# Patient Record
Sex: MALE | Race: AMERICAN INDIAN OR ALASKA NATIVE
[De-identification: names, ages, dates, MRNs, and addresses within clinical notes are randomized per-mention and may not be internally consistent; named-entity substitution may affect disease eponyms.]

---

## 2020-01-14 ENCOUNTER — HOSPITAL ENCOUNTER (EMERGENCY)
Dept: HOSPITAL 5 - ED | Age: 20
Discharge: HOME | End: 2020-01-14
Payer: SELF-PAY

## 2020-01-14 VITALS — DIASTOLIC BLOOD PRESSURE: 80 MMHG | SYSTOLIC BLOOD PRESSURE: 118 MMHG

## 2020-01-14 DIAGNOSIS — K21.9: Primary | ICD-10-CM

## 2020-01-14 DIAGNOSIS — Z79.899: ICD-10-CM

## 2020-01-14 LAB
ALBUMIN SERPL-MCNC: 3.7 G/DL (ref 3.9–5)
ALT SERPL-CCNC: 10 UNITS/L (ref 7–56)
BACTERIA #/AREA URNS HPF: (no result) /HPF
BILIRUB UR QL STRIP: (no result)
BLOOD UR QL VISUAL: (no result)
BUN SERPL-MCNC: 9 MG/DL (ref 9–20)
BUN/CREAT SERPL: 11 %
CALCIUM SERPL-MCNC: 9.1 MG/DL (ref 8.4–10.2)
HCT VFR BLD CALC: 39.4 % (ref 35.5–45.6)
HEMOLYSIS INDEX: 5
HGB BLD-MCNC: 13.5 GM/DL (ref 11.8–15.2)
MCHC RBC AUTO-ENTMCNC: 34 % (ref 32–34)
MCV RBC AUTO: 83 FL (ref 84–94)
MUCOUS THREADS #/AREA URNS HPF: (no result) /HPF
PH UR STRIP: 5 [PH] (ref 5–7)
PLATELET # BLD: 169 K/MM3 (ref 140–440)
PROT UR STRIP-MCNC: (no result) MG/DL
RBC # BLD AUTO: 4.73 M/MM3 (ref 3.65–5.03)
RBC #/AREA URNS HPF: 2 /HPF (ref 0–6)
UROBILINOGEN UR-MCNC: < 2 MG/DL (ref ?–2)
WBC #/AREA URNS HPF: < 1 /HPF (ref 0–6)

## 2020-01-14 PROCEDURE — 81001 URINALYSIS AUTO W/SCOPE: CPT

## 2020-01-14 PROCEDURE — 36415 COLL VENOUS BLD VENIPUNCTURE: CPT

## 2020-01-14 PROCEDURE — 85027 COMPLETE CBC AUTOMATED: CPT

## 2020-01-14 PROCEDURE — 83690 ASSAY OF LIPASE: CPT

## 2020-01-14 PROCEDURE — 80053 COMPREHEN METABOLIC PANEL: CPT

## 2020-01-14 NOTE — EMERGENCY DEPARTMENT REPORT
ED Abdominal Pain HPI





- General


Chief Complaint: Abdominal Pain


Stated Complaint: STOMACH PAIN


Time Seen by Provider: 20 07:10


Source: patient, EMS


Mode of arrival: Ambulatory


Limitations: No Limitations





- History of Present Illness


Initial Comments: 





This is a 19-year-old -American male who presents to the emergency room 

with diffuse abdominal pain started yesterday.  Reports pain is occasional but 

lasts for several minutes to an hour.  Current marijuana smoker.  Patient denies

recent travel, nausea, vomiting, diarrhea, fever, chills, cough, chest pain, or 

palpitations.


MD Complaint: abdominal pain


Onset/Timin


-: days(s)


Location: diffuse


Radiation: none


Migration to: no migration


Severity: moderate


Severity scale (0 -10): 6


Quality: cramping


Consistency: intermittent


Improves With: nothing


Worsens With: nothing


Associated Symptoms: denies other symptoms





- Related Data


                                  Previous Rx's











 Medication  Instructions  Recorded  Last Taken  Type


 


Oseltamivir [Tamiflu] 75 mg PO BID #10 cap 18 Unknown Rx


 


Ibuprofen [Motrin] 800 mg PO Q8HR #30 tablet 18 Unknown Rx


 


methOCARBAMOL [Robaxin TAB] 500 mg PO BID #10 tab 18 Unknown Rx


 


Omeprazole 40 mg PO DAILY #30 capsule. 20 Unknown Rx











                                    Allergies











Allergy/AdvReac Type Severity Reaction Status Date / Time


 


No Known Allergies Allergy   Verified 18 06:09














ED Review of Systems


ROS: 


Stated complaint: STOMACH PAIN


Other details as noted in HPI





Constitutional: denies: chills, fever


Respiratory: denies: cough, shortness of breath, wheezing


Cardiovascular: denies: chest pain, palpitations


Gastrointestinal: abdominal pain.  denies: nausea, diarrhea


Musculoskeletal: denies: back pain, joint swelling, arthralgia


Skin: denies: rash, lesions


Neurological: denies: headache, weakness, paresthesias


Psychiatric: denies: anxiety, depression





ED Past Medical Hx





- Surgical History


Additional Surgical History: PE tubes





- Social History


Smoking Status: Never Smoker


Substance Use Type: Alcohol





- Medications


Home Medications: 


                                Home Medications











 Medication  Instructions  Recorded  Confirmed  Last Taken  Type


 


Oseltamivir [Tamiflu] 75 mg PO BID #10 cap 18  Unknown Rx


 


Ibuprofen [Motrin] 800 mg PO Q8HR #30 tablet 18  Unknown Rx


 


methOCARBAMOL [Robaxin TAB] 500 mg PO BID #10 tab 18  Unknown Rx


 


Omeprazole 40 mg PO DAILY #30 capsule. 20  Unknown Rx














ED Physical Exam





- General


Limitations: No Limitations


General appearance: alert, in no apparent distress





- Respiratory


Respiratory exam: Present: normal lung sounds bilaterally.  Absent: respiratory 

distress





- Cardiovascular


Cardiovascular Exam: Present: regular rate, normal rhythm.  Absent: systolic 

murmur, diastolic murmur, rubs, gallop





- GI/Abdominal


GI/Abdominal exam: Present: soft, normal bowel sounds.  Absent: distended, 

tenderness, guarding, rebound, rigid, organomegaly





- Back Exam


Back exam: Absent: CVA tenderness (R), CVA tenderness (L)





- Neurological Exam


Neurological exam: Present: alert, oriented X3, normal gait





- Psychiatric


Psychiatric exam: Present: normal affect, normal mood





- Skin


Skin exam: Present: warm, dry, intact, normal color.  Absent: rash





ED Course


                                   Vital Signs











  20





  02:02


 


Temperature 98.7 F


 


Pulse Rate 68


 


Respiratory 14





Rate 


 


Blood Pressure 118/80


 


O2 Sat by Pulse 100





Oximetry 














ED Medical Decision Making





- Lab Data


Result diagrams: 


                                 20 07:37





                                 20 07:37








                                   Lab Results











  20 Range/Units





  07:37 07:37 08:26 


 


WBC  6.6    (4.5-11.0)  K/mm3


 


RBC  4.73    (3.65-5.03)  M/mm3


 


Hgb  13.5    (11.8-15.2)  gm/dl


 


Hct  39.4    (35.5-45.6)  %


 


MCV  83 L    (84-94)  fl


 


MCH  29    (28-32)  pg


 


MCHC  34    (32-34)  %


 


RDW  15.2    (13.2-15.2)  %


 


Plt Count  169    (140-440)  K/mm3


 


Sodium   142   (137-145)  mmol/L


 


Potassium   3.6   (3.6-5.0)  mmol/L


 


Chloride   106.2   ()  mmol/L


 


Carbon Dioxide   25   (22-30)  mmol/L


 


Anion Gap   14   mmol/L


 


BUN   9   (9-20)  mg/dL


 


Creatinine   0.8   (0.8-1.5)  mg/dL


 


Estimated GFR   > 60   ml/min


 


BUN/Creatinine Ratio   11   %


 


Glucose   100   ()  mg/dL


 


Calcium   9.1   (8.4-10.2)  mg/dL


 


Total Bilirubin   0.80   (0.1-1.2)  mg/dL


 


AST   14   (5-40)  units/L


 


ALT   10   (7-56)  units/L


 


Alkaline Phosphatase   29 L   ()  units/L


 


Total Protein   5.4 L   (6.3-8.2)  g/dL


 


Albumin   3.7 L   (3.9-5)  g/dL


 


Albumin/Globulin Ratio   2.2   %


 


Lipase   20   (13-60)  units/L


 


Urine Color    Yellow  (Yellow)  


 


Urine Turbidity    Clear  (Clear)  


 


Urine pH    5.0  (5.0-7.0)  


 


Ur Specific Gravity    1.025  (1.003-1.030)  


 


Urine Protein    <15 mg/dl  (Negative)  mg/dL


 


Urine Glucose (UA)    Neg  (Negative)  mg/dL


 


Urine Ketones    Neg  (Negative)  mg/dL


 


Urine Blood    Neg  (Negative)  


 


Urine Nitrite    Neg  (Negative)  


 


Urine Bilirubin    Neg  (Negative)  


 


Urine Urobilinogen    < 2.0  (<2.0)  mg/dL


 


Ur Leukocyte Esterase    Neg  (Negative)  


 


Urine WBC (Auto)    < 1.0  (0.0-6.0)  /HPF


 


Urine RBC (Auto)    2.0  (0.0-6.0)  /HPF


 


Urine Bacteria (Auto)    1+  (Negative)  /HPF


 


Urine Mucus    1+  /HPF














- Medical Decision Making





This is a 19 y.o. female that presents with diffuse abdominal pain for 1 day.  

Vitals are stable inpatient in no acute distress.  Obtained CMP, CBC, & UA. All 

unremarkable.  Abdominal exam without peritoneal signs.  No signs of 

dehydration.  No evidence of surgical abdomen or other acute medical emergency 

including bowel obstruction.  Presentation not consistent with other acute, 

emergent causes of vomiting/diarrhea at this time. No indication for abdominal 

imaging.  Given Bentyl.  Reassessed and reports feeling better.  Start 

omeprazole.  Discussed plan with patient and agreed to plan. No further 

questions noted by the patient.  Follow up with PCP in 2-3 days.  Strict return 

instructions given.  Discharged home in stable condition. 


Critical care attestation.: 


If time is entered above; I have spent that time in minutes in the direct care 

of this critically ill patient, excluding procedure time.








ED Disposition


Clinical Impression: 


 Abdominal cramping, generalized, Gastroenteritis





Disposition:  TO HOME OR SELFCARE


Is pt being admited?: No


Condition: Stable


Instructions:  Gastroenteritis (ED)


Additional Instructions: 


Frequent hand washing is important to reduce spread.





Prompt disinfection of contaminated surfaces with household chlorine bleach-

based  and washing of soiled clothing and bedding should be advised.





If food or water is thought to be contaminated, it should be avoided.





Increase fluid intake.





Drinks high in sugars such as carbonated soft drinks, fruit juice, and highly 

sugared liquids should be avoided.


Prescriptions: 


Omeprazole 40 mg PO DAILY #30 capsule.dr


Referrals: 


[facility] [Outside] - 3-5 Days


[facility] [Outside] - 3-5 Days


MILLI ISAAC MD [Staff Physician] - 3-5 Days


Forms:  Work/School Release Form(ED)


Time of Disposition: 10:14

## 2020-01-21 ENCOUNTER — HOSPITAL ENCOUNTER (EMERGENCY)
Dept: HOSPITAL 5 - ED | Age: 20
LOS: 1 days | Discharge: HOME | End: 2020-01-22
Payer: SELF-PAY

## 2020-01-21 VITALS — DIASTOLIC BLOOD PRESSURE: 74 MMHG | SYSTOLIC BLOOD PRESSURE: 113 MMHG
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
## 2020-01-21 DIAGNOSIS — F17.200: ICD-10-CM
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
## 2020-01-21 DIAGNOSIS — S61.233A: Primary | ICD-10-CM

## 2020-01-21 DIAGNOSIS — Z79.899: ICD-10-CM

## 2020-01-21 PROCEDURE — 90715 TDAP VACCINE 7 YRS/> IM: CPT

## 2020-01-21 PROCEDURE — 90471 IMMUNIZATION ADMIN: CPT

## 2020-01-21 NOTE — EVENT NOTE
ED Screening Note


Date of service: 01/21/20


Time: 20:37


ED Screening Note: 


19 y o male presents with power drill injury while working on his car today





This initial assessment/diagnostic orders/clinical plan/treatment(s) is/are 

subject to change based on patients health status, clinical progression and re-

assessment by fellow clinical providers in the ED. Further treatment and workup 

at subsequent clinical providers discretion. Patient/guardian urged not to elope

from the ED as their condition may be serious if not clinically assessed and 

managed. 





Initial orders include: 


tetanus booster


xr finger


acc eval

## 2020-01-21 NOTE — XRAY REPORT
LEFT HAND 3 VIEWS



INDICATION / CLINICAL INFORMATION:

MAIN: FB; Working on car and drilled screw into 3rd digit left hand. Pt drilled full screw out. No bl
eeding. +crt and left radial pulse. No obvious deformities. Swollen. 



COMPARISON:

None available.

 

FINDINGS:



BONES / JOINT(S): No acute fracture or subluxation. No significant arthritis.

SOFT TISSUES: No significant abnormality.



ADDITIONAL FINDINGS: None.







Signer Name: Chirag Gomes MD 

Signed: 1/21/2020 9:20 PM

 Workstation Name: [id number]

## 2020-01-21 NOTE — EMERGENCY DEPARTMENT REPORT
ED Upper Extremity Inj HPI





- General


Chief Complaint: Extremity Injury, Upper


Stated Complaint: DRILLED SCREW INTO LT MIDDLE FINGER


Time Seen by Provider: 20 23:39


Source: patient, EMS


Mode of arrival: Ambulatory


Limitations: No Limitations





- History of Present Illness


MD Complaint: Injury to:: left, finger (3rd digit)


Other Extremity Injury: Fingers: Left


Other Injuries: none


Handedness: right


Place: work


Improves With: none


Worsens With: none


Context: direct blow (was utilizing a screw and extend the screwed himself in 

the finger causing pain bleeding and swelling earlier today.  Is tender and is 

worried about infection)


Associated Symptoms: suspects foreign body (also range of motion of the screw 

broke off in his finger although he reports it was when he came out).  denies: 

weakness, numbness, nausea/vomiting, heard/felt popping sensat





- Related Data


                                  Previous Rx's











 Medication  Instructions  Recorded  Last Taken  Type


 


Oseltamivir [Tamiflu] 75 mg PO BID #10 cap 18 Unknown Rx


 


Ibuprofen [Motrin] 800 mg PO Q8HR #30 tablet 18 Unknown Rx


 


methOCARBAMOL [Robaxin TAB] 500 mg PO BID #10 tab 18 Unknown Rx


 


Omeprazole 40 mg PO DAILY #30 capsule. 20 Unknown Rx


 


cephALEXin [Keflex] 500 mg PO Q8HR #21 cap 20 Unknown Rx











                                    Allergies











Allergy/AdvReac Type Severity Reaction Status Date / Time


 


No Known Allergies Allergy   Verified 18 06:09














ED Review of Systems


ROS: 


Stated complaint: DRILLED SCREW INTO LT MIDDLE FINGER


Other details as noted in HPI





Comment: All other systems reviewed and negative





ED Past Medical Hx





- Surgical History


Additional Surgical History: PE tubes





- Social History


Smoking Status: Current Every Day Smoker


Substance Use Type: None





- Medications


Home Medications: 


                                Home Medications











 Medication  Instructions  Recorded  Confirmed  Last Taken  Type


 


Oseltamivir [Tamiflu] 75 mg PO BID #10 cap 18  Unknown Rx


 


Ibuprofen [Motrin] 800 mg PO Q8HR #30 tablet 18  Unknown Rx


 


methOCARBAMOL [Robaxin TAB] 500 mg PO BID #10 tab 18  Unknown Rx


 


Omeprazole 40 mg PO DAILY #30 capsule. 20  Unknown Rx


 


cephALEXin [Keflex] 500 mg PO Q8HR #21 cap 20  Unknown Rx














ED Physical Exam





- General


Limitations: No Limitations


General appearance: alert, in no apparent distress





- Head


Head exam: Present: atraumatic, normocephalic





- Eye


Eye exam: Present: normal appearance





- ENT


ENT exam: Present: mucous membranes moist





- Neck


Neck exam: Present: normal inspection





- Respiratory


Respiratory exam: Present: normal lung sounds bilaterally.  Absent: respiratory 

distress





- Cardiovascular


Cardiovascular Exam: Present: regular rate, normal rhythm.  Absent: systolic 

murmur, diastolic murmur, rubs, gallop





- GI/Abdominal


GI/Abdominal exam: Present: soft, normal bowel sounds





- Rectal


Rectal exam: Present: deferred





- Extremities Exam


Extremities exam: Present: normal inspection, tenderness (there is tenderness 

and swelling around the puncture wound to the third digit on the on the finger 

with some local redness.  Capillary refills are brisk no discharge no 

lymphangitis appreciated.), normal capillary refill





- Back Exam


Back exam: Present: normal inspection





- Neurological Exam


Neurological exam: Present: alert, oriented X3





- Psychiatric


Psychiatric exam: Present: normal affect, normal mood





- Skin


Skin exam: Present: warm, dry, intact, normal color.  Absent: rash





ED Course





                                   Vital Signs











  20





  20:31


 


Temperature 98.5 F


 


Pulse Rate 73


 


Respiratory 20





Rate 


 


Blood Pressure 113/74


 


O2 Sat by Pulse 97





Oximetry 














ED Medical Decision Making





- Radiology Data


Radiology results: report reviewed








Referring Physician:   REMIGIO ZHANG


Patient Name:   JELANI JERMAYNE GOGINS


Patient ID:   [id number]


YOB: 2000


Sex:   Male


Accession:   [id number]


Report Date:   2020


Report Status:   Finalized


Findings


[facility]


[address] [place] [address]





XRay Report


Signed





Patient: GOGINS,JELANI JERMAYNE MR#:


[id number]


: 2000 Acct:[id number]





Age/Sex: 19 / M ADM Date: 20





Loc: ED


Attending Dr:








Ordering Physician: LAI TORIBIO


Date of Service: 20


Procedure(s): XR hand 3+V LT


Accession Number(s): [id number]





cc: LAI TORIBIO





Fluoro Time In Minutes:





LEFT HAND 3 VIEWS





INDICATION / CLINICAL INFORMATION:


MAIN: FB; Working on car and drilled screw into 3rd digit left hand. Pt drilled 

full screw out. No


bleeding. +crt and left radial pulse. No obvious deformities. Swollen.





COMPARISON:


None available.





FINDINGS:





BONES / JOINT(S): No acute fracture or subluxation. No significant arthritis.


SOFT TISSUES: No significant abnormality.





ADDITIONAL FINDINGS: None.











Signer Name: Chirag Gomes MD


Signed: 2020 9:20 PM


Workstation Name: [id number]








Transcribed By: ES


Dictated By: Chirag Gomes MD


Electronically Authenticated By: Chirag Gomes MD


Signed Date/Time: 20











DD/DT: 20





- Medical Decision Making





19-year-old male status post puncture wound in the finger with a screw while 

working on car.  Tetanus shot was updated in the x-ray showed no evidence of any

 foreign bodies or fractures.  No evidence of any emergent Condition.  Will have

 him to follow-up in 48 hours for wound recheck with the primary care provider


Critical care attestation.: 


If time is entered above; I have spent that time in minutes in the direct care 

of this critically ill patient, excluding procedure time.








ED Disposition


Clinical Impression: 


 Puncture wound





Disposition: DC-01 TO HOME OR SELFCARE


Is pt being admited?: No


Does the pt Need Aspirin: No


Condition: Stable


Instructions:  Puncture Wound (ED)


Prescriptions: 


cephALEXin [Keflex] 500 mg PO Q8HR #21 cap


Referrals: 


PRIMARY MD SAÚL [Primary Care Provider] - 3-5 Days


MILLI ISAAC MD [Staff Physician] - 2-3 Days (Please follow up for 

reevaluation)